# Patient Record
Sex: MALE | Race: AMERICAN INDIAN OR ALASKA NATIVE
[De-identification: names, ages, dates, MRNs, and addresses within clinical notes are randomized per-mention and may not be internally consistent; named-entity substitution may affect disease eponyms.]

---

## 2020-11-16 ENCOUNTER — HOSPITAL ENCOUNTER (EMERGENCY)
Dept: HOSPITAL 5 - ED | Age: 20
Discharge: HOME | End: 2020-11-16
Payer: SELF-PAY

## 2020-11-16 DIAGNOSIS — S39.012A: Primary | ICD-10-CM
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
## 2020-11-16 DIAGNOSIS — Z79.899: ICD-10-CM

## 2020-11-16 DIAGNOSIS — Z88.0: ICD-10-CM
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
## 2020-11-16 PROCEDURE — 99282 EMERGENCY DEPT VISIT SF MDM: CPT

## 2021-08-25 ENCOUNTER — HOSPITAL ENCOUNTER (EMERGENCY)
Dept: HOSPITAL 5 - ED | Age: 21
Discharge: HOME | End: 2021-08-25
Payer: SELF-PAY

## 2021-08-25 VITALS — SYSTOLIC BLOOD PRESSURE: 119 MMHG | DIASTOLIC BLOOD PRESSURE: 49 MMHG

## 2021-08-25 DIAGNOSIS — R36.9: Primary | ICD-10-CM

## 2021-08-25 DIAGNOSIS — Z20.2: ICD-10-CM

## 2021-08-25 DIAGNOSIS — R30.0: ICD-10-CM

## 2021-08-25 PROCEDURE — 99281 EMR DPT VST MAYX REQ PHY/QHP: CPT

## 2021-08-25 NOTE — EMERGENCY DEPARTMENT REPORT
Chief Complaint: Urogenital-Male


Stated Complaint: DISCHARGE X 1 DAY


Time Seen by Provider: 08/25/21 12:37





- HPI


History of Present Illness: 





Patient is a 21-year-old male presents emergency room complaints of white penile

discharge that began yesterday.  He states he is also been experiencing some 

dysuria.  He reports he was sexually active 2 weeks ago.  He denies any fever, 

nausea, vomiting, diarrhea, urinary retention, hematuria, abdominal pain, pain 

or swelling the testicles.  No past medical history.  Allergy to penicillin.














Vitals are normal











On exam:


Non toxic appearing, no acute distress


atraumatic, normocephalic


normal appearance of the eyes, EOMI, no periorbital edema or ecchymosis


moist mucus membranes


No respiratory distress, no accessory muscle use


A&O x4, normal gait




















Patient is presenting with symptoms which appear most likely consistent with 

male STD


This hospital facility policy does not test or treat for uncomplicated male STDs


Patient given the appropriate resources


Discuss strict return precautions











Medical screen examination performed and there is no threat to life or limb at 

this time





- Exam


Vital Signs: 


                                   Vital Signs











  08/25/21 08/25/21





  11:22 11:24


 


Temperature  98.8 F


 


Pulse Rate 62 


 


Respiratory 20 





Rate  


 


Blood Pressure 119/49 


 


O2 Sat by Pulse 100 





Oximetry  











MSE screening note: 


Focused history and physical exam performed.














ED Disposition for MSE


Clinical Impression: 


 Penile discharge, Dysuria, Concern about STD in male without diagnosis





Disposition: 01 HOME / SELF CARE / HOMELESS


Is pt being admited?: No


Does the pt Need Aspirin: No


Condition: Stable


Instructions:  Safe Sex


Additional Instructions: 











please follow up with clinic or health department for full STD panel. have any 

partner tested and treated as well. avoid sexual intercourse. return to the 

emergency room for any new or worsening symptoms. 




















[facility]


Address: [address] [place] [id number]


Phone: [phone]


Referrals: 


[facility] [Outside] - 2-3 Days


Time of Disposition: 12:38


Print Language: ENGLISH
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
